# Patient Record
Sex: MALE | Race: WHITE | NOT HISPANIC OR LATINO | URBAN - METROPOLITAN AREA
[De-identification: names, ages, dates, MRNs, and addresses within clinical notes are randomized per-mention and may not be internally consistent; named-entity substitution may affect disease eponyms.]

---

## 2019-09-15 ENCOUNTER — EMERGENCY (EMERGENCY)
Facility: HOSPITAL | Age: 49
LOS: 1 days | Discharge: HOME | End: 2019-09-15
Attending: EMERGENCY MEDICINE | Admitting: EMERGENCY MEDICINE
Payer: SELF-PAY

## 2019-09-15 VITALS
DIASTOLIC BLOOD PRESSURE: 73 MMHG | SYSTOLIC BLOOD PRESSURE: 120 MMHG | TEMPERATURE: 98 F | OXYGEN SATURATION: 95 % | HEIGHT: 67 IN | WEIGHT: 274.92 LBS | RESPIRATION RATE: 18 BRPM | HEART RATE: 108 BPM

## 2019-09-15 VITALS
DIASTOLIC BLOOD PRESSURE: 69 MMHG | SYSTOLIC BLOOD PRESSURE: 112 MMHG | RESPIRATION RATE: 17 BRPM | OXYGEN SATURATION: 97 % | TEMPERATURE: 98 F | HEART RATE: 89 BPM

## 2019-09-15 DIAGNOSIS — F10.129 ALCOHOL ABUSE WITH INTOXICATION, UNSPECIFIED: ICD-10-CM

## 2019-09-15 PROCEDURE — 99284 EMERGENCY DEPT VISIT MOD MDM: CPT

## 2019-09-15 PROCEDURE — 99285 EMERGENCY DEPT VISIT HI MDM: CPT

## 2019-09-15 NOTE — ED PROVIDER NOTE - NSFOLLOWUPINSTRUCTIONS_ED_ALL_ED_FT
Alcohol Intoxication    WHAT YOU NEED TO KNOW:    Alcohol intoxication is a harmful physical condition caused when you drink more alcohol than your body can handle. It is also called ethanol poisoning, or being drunk.    DISCHARGE INSTRUCTIONS:    Call your local emergency number (911 in the ) if:     You have sudden trouble breathing or chest pain.      You have a seizure.      You feel sad enough to harm yourself or others.    Call your doctor if:     You have hallucinations (you see or hear things that are not real).      You cannot stop vomiting.      You have questions or concerns about your condition or care.    Recommended alcohol limits:     Men 21 to 64 years should limit alcohol to 2 drinks a day. Do not have more than 4 drinks in 1 day or more than 14 in 1 week.      All women, and men 65 or older should limit alcohol to 1 drink in a day. Do not have more than 3 drinks in 1 day or more than 7 in 1 week. No amount of alcohol is okay during pregnancy.    Manage alcohol use:     Decrease the amount you drink. This can help prevent health problems such as brain, heart, and liver damage, high blood pressure, diabetes, and cancer. If you cannot stop completely, healthcare providers can help you set goals to decrease the amount you drink.      Plan weekly alcohol use. You will be less likely to drink more than the recommended limit if you plan ahead.      Have food when you drink alcohol. Food will prevent alcohol from getting into your system too quickly. Eat before you have your first alcohol drink.      Time your drinks carefully. Have no more than 1 drink in an hour. Have a liquid such as water, coffee, or a soft drink between alcohol drinks.      Do not drive if you have had alcohol. Make sure someone who has not been drinking can help you get home.      Do not drink alcohol if you are taking medicine. Alcohol is dangerous when you combine it with certain medicines, such as acetaminophen or blood pressure medicine. Talk to your healthcare provider about all the medicines you currently take.    For more information:     Alcoholics Anonymous  Web Address: http://www.aa.org      Substance Abuse and Mental Health Services Administration  [address]  [place] [address]  Web Address: http://www.samhsa.gov      Follow up with your healthcare provider as directed: Write down your questions so you remember to ask them during your visits.

## 2019-09-15 NOTE — ED PROVIDER NOTE - MUSCULOSKELETAL, MLM
Spine appears normal, range of motion is not limited, no muscle or joint tenderness. No C spine ttp. Spine appears normal, range of motion is not limited, no muscle or joint tenderness. No C spine ttp. No clonus, rigidity, hyperreflexia or tremors.

## 2019-09-15 NOTE — ED PROVIDER NOTE - OBJECTIVE STATEMENT
48M h/o asthma in the past BIBEMS for ETOH intox, found laying on street. Pt lives in [place], was visiting  friend last night at [place], states had too much whiskey to drink and couldn't make his way back to the train station so fell asleep on the road. Denies any injury or pain. States he can take a cab to the train station and go home. Lives alone. Denies SI or HI.  States drinks usually on weekends, no h/o WD.

## 2019-09-15 NOTE — ED PROVIDER NOTE - PATIENT PORTAL LINK FT
You can access the FollowMyHealth Patient Portal offered by [facility] by registering at the following website: [email or web address]. By joining [facility]’s FollowMyHealth portal, you will also be able to view your health information using other applications (apps) compatible with our system.

## 2019-09-15 NOTE — ED ADULT NURSE NOTE - OBJECTIVE STATEMENT
Pt found in the street after drinking. On arrival to ED patient is awake, alert, oriented. Reports drinking whiskey last night with a friend, unclear what happened thereafter woke up in street. Denies trauma.

## 2024-07-30 NOTE — ED ADULT NURSE NOTE - CADM POA PRESS ULCER
"Subjective:      Patient ID: Abel Green is a 57 y.o. male.    Vitals:  height is 5' 10" (1.778 m) and weight is 104.3 kg (230 lb). His oral temperature is 98.1 °F (36.7 °C). His blood pressure is 137/85 and his pulse is 71. His respiration is 18 and oxygen saturation is 98%.     Chief Complaint: Other Misc (Eyesight issues. Small pupils, intermittent blurry vision with my glasses on. Eye discharge. - Entered by patient) and Eye Problem    Pt presents w/ c.o bilateral eye blurriness and constricted pupils. Pt reports it feels like theres stuff in both eyes. Pt says he's been sneezing a lot recently as well. Onset 1 week ago.   Provider note begins below    Patient reports symptoms eye discharge started after he visited [place].  Reports sneezing, itching of eyes, and white mucus.  When he blinks his eyes the blurry in his clears.  He feels like his pupils are a little more constricted than normal.  He has recently started on Prozac.  He states he spoke with the psychiatrist who did not believe that this was the cause of the people constriction.  He is unable to get him with his optometrist for 2 weeks.  Denies any pain to his eyes or loss of vision.  States his eyes were not crusted shut.    Eye Problem   Both eyes are affected. This is a new problem. The current episode started 1 to 4 weeks ago. The problem occurs constantly. The problem has been unchanged. There was no injury mechanism. The pain is at a severity of 0/10. There is No known exposure to pink eye. He Does not wear contacts. Associated symptoms include blurred vision, an eye discharge, eye redness and a foreign body sensation. Pertinent negatives include no double vision, fever, itching, nausea, photophobia, recent URI or vomiting. He has tried nothing for the symptoms.       Constitution: Negative for sweating, fatigue and fever.   Cardiovascular:  Negative for chest pain and sob on exertion.   Eyes:  Positive for eye discharge, eye redness and " blurred vision. Negative for eye trauma, foreign body in eye, eye itching, eye pain, photophobia, vision loss, double vision and eyelid swelling.   Respiratory:  Negative for cough and shortness of breath.    Gastrointestinal:  Negative for nausea, vomiting, constipation and diarrhea.      Objective:     Physical Exam   Constitutional: He is oriented to person, place, and time.   HENT:   Head: Normocephalic and atraumatic.   Eyes: Lids are normal. Pupils are equal, round, and reactive to light. Lids are everted and swept, no foreign bodies found. Right eye exhibits discharge. No foreign body present in the right eye. Left eye exhibits discharge. No foreign body present in the left eye. Right conjunctiva is injected. Left conjunctiva is injected. Extraocular movement intact   Cardiovascular: Normal rate.   Pulmonary/Chest: Effort normal. No respiratory distress.   Abdominal: Normal appearance.   Neurological: He is alert and oriented to person, place, and time.   Skin: Skin is dry.   Psychiatric: His behavior is normal. Mood normal.       Assessment:     1. Allergic conjunctivitis and rhinitis, bilateral      Vision Screening    Right eye Left eye Both eyes   Without correction      With correction 20/40 20/40 20/30       Plan:   ED precautions   Short course of oral steroids, [place]  If not improving may start antibiotic eyedrops      As needed referral to Ophthalmology    Please follow up with your Primary care provider or Opthalmologist within 48-72 hours if your signs and symptoms have not improved.      If your condition worsens or fails to improve we recommend that you receive another evaluation at the emergency room immediately or contact your primary medical clinic or opthalmology to discuss your concerns.     If you were given an antibiotic today, please complete all your antibiotic as directed.       Use warm compresses to eye followed by a gentle eye massage of the area.  You may clean the lid with very  diluted baby shampoo and water with a Qtip or soft swab.  You can also use artificial tears as needed.     You must understand that you've received an urgent care treatment only and that you may be released before all your medical problems are known or treated. You the patient will arrange for followup care as instructed.   Use the eye drops as prescribed while awake initially. Use the eye drops for 24 hours after the last day of eye symptoms.   Do not wear your contact lens ( if you use them) for at least 5 days after you stop having symptoms and are rechecked by your doctor. Throw away the contacts, contact solution and carrying case you were using and start with new material. You may also need to throw away any eye makeup/mascara that you used while your eye was irritated.  If you develop increase eye symptoms or change in your vision seek medical care immediately either with your ophthalomologist or the ER or return here.     Allergic conjunctivitis and rhinitis, bilateral  -     olopatadine (PATANOL) 0.1 % ophthalmic solution; Place 1 drop into both eyes 2 (two) times daily. for 7 days  Dispense: 5 mL; Refill: 0  -     ofloxacin (OCUFLOX) 0.3 % ophthalmic solution; Place 1 drop into both eyes 4 (four) times daily. for 7 days  Dispense: 5 mL; Refill: 0  -     predniSONE (DELTASONE) 20 MG tablet; Take 1 tablet (20 mg total) by mouth once daily. for 3 days  Dispense: 3 tablet; Refill: 0  -     Ambulatory referral/consult to Ophthalmology                     No